# Patient Record
Sex: MALE | Race: ASIAN | Employment: UNEMPLOYED | URBAN - METROPOLITAN AREA
[De-identification: names, ages, dates, MRNs, and addresses within clinical notes are randomized per-mention and may not be internally consistent; named-entity substitution may affect disease eponyms.]

---

## 2021-05-21 ENCOUNTER — OFFICE VISIT (OUTPATIENT)
Dept: INTERNAL MEDICINE | Facility: CLINIC | Age: 56
End: 2021-05-21
Payer: COMMERCIAL

## 2021-05-21 DIAGNOSIS — Z53.9 ERRONEOUS ENCOUNTER--DISREGARD: Primary | ICD-10-CM

## 2021-05-21 ASSESSMENT — PATIENT HEALTH QUESTIONNAIRE - PHQ9
10. IF YOU CHECKED OFF ANY PROBLEMS, HOW DIFFICULT HAVE THESE PROBLEMS MADE IT FOR YOU TO DO YOUR WORK, TAKE CARE OF THINGS AT HOME, OR GET ALONG WITH OTHER PEOPLE: EXTREMELY DIFFICULT
SUM OF ALL RESPONSES TO PHQ QUESTIONS 1-9: 20
SUM OF ALL RESPONSES TO PHQ QUESTIONS 1-9: 20

## 2021-05-21 NOTE — PROGRESS NOTES
"Appt was cancelled by patient after arrival of pt and him getting into argument with my nurse during rooming process.  I entered exam room to talk with pt  After being informed by my nurse Cyndi that she  could not interact with pt further. At that point, pt had already made decision to cancel appt. I apologized that this interaction occurred and offered to see pt still but he did not wish to proceed with appt after interaction with Cyndi. I asked if there were any acute issues that I could at least briefly talk about without having appt.  I had not opened pt's chart at that time and comply talked with him in exam room to get his side of story re: what happened with my nurse earlier. Pt stated part of reason for appt was  for testing for DM with hx some slow urine stream and occ dribbling for 6 mos. Pt made no mention of any mental health concerns to me with any of our conversation together. Pt stated that he could not see me if I would have same nurse in the future potentially rooming and interacting with him and I apologized that the interaction had happened and offered to provider any care for him in the future if pt chose to return to the clinic. After he left and looking in his chart, eleavted PHQ score noted (which pt never mentioned as a concern to me). Tried calling pt but NA and VM box is full so could not leave message. Will discuss with clinic Admin later today so they  are aware of episode and also will try to reach pt again today to confirm safety status re: mental health    Addendum: 10:11 AM. Pt had seen that I had called on his phone and returned call to clinic. I was then able to reach him by phone. Pt states that Covid pandemic  has been a stress on him and has had some depression sx for about 6 mos. Not currently working.  I specifically asked if he was having any active suicidal thoughts at this time and he says \"no\". Denies any active plan for suicide. States  Thought has just gone through " his head in the past. States talking wit his wife and that he would reach out to her if he ever truly began to seriously contemplate  Suicide. Has not been on meds or counseling in past. Pt denies any acute worsening of depression sx now compared to the previous 6 mos. Will therefore  Defer starting medication for pt since I will not be managing his care in the future and pt states he will likely seek care from clinic closer to his home in [place]. He chose  [facility] originally to see me based on  Referral from wife's friend per pt report. Instructed him that if he had any acute suicidal ideation, then he should be seen in neast ER/Urgency Room immediately

## 2021-05-22 ASSESSMENT — PATIENT HEALTH QUESTIONNAIRE - PHQ9: SUM OF ALL RESPONSES TO PHQ QUESTIONS 1-9: 20
